# Patient Record
(demographics unavailable — no encounter records)

---

## 2024-11-14 NOTE — DISCUSSION/SUMMARY
[FreeTextEntry1] : ROYCE BRICE is a 52 year old F who presents today Nov 14, 2024 with the above history and the following active issues:  22 seconds of afib during endoscopy, heartburn, There are intermittent palpitations, dizziness, and lightheadedness. Reports edema after long car rides. Obtain exercise stress test to assess BP response to exercise, exercise induced arrhythmias, or evidence of ischemia. CT calcium score ordered for risk stratification. Obtain abdominal ultrasound to evaluate for aortic aneurysm or significant aortic atherosclerosis.  Obtain 7 day Zio monitor to determine presence of arrhythmias or significant pauses.  Re-consider EP eval at f/u OV. Declines today.  Brief new onset AHMET converting to sinus rhythm with rate control Lopressor. [id number] score 2, allergy to aspirin, currently not on anticoagulation. Hypotension, chlorthalidone discontinued Toprol reduced to 25 Mg daily.  - Hypertension at guideline goal on Toprol 25 Mg daily  - Family history of premature CAD father PCI stent age 60  Ongoing f/u with PCP.  F/U after testing to review results. Discussed red flag symptoms, which would warrant sooner or emergent medical evaluation. Any questions and concerns were addressed and resolved.  Sincerely, Alayna Ely [facility] Patient's history, testing, and plan was reviewed with supervising physician, Dr. Patrick Valentino

## 2024-11-14 NOTE — HISTORY OF PRESENT ILLNESS
[FreeTextEntry1] : ROYCE BRICE is a 52 year old female with a past medical history of Brief PAF May 2022, [id number] 2 not on AC, aspirin allergy., hypertension, hypothyroidism on Synthroid therapy, family history of premature CAD father PCI stent age 60.  Patient had palpitations admitted [facility] 5/9/2022 new onset AHMET converting to sinus rhythm with Lopressor rate control. Low risk [id number] score 2. Patient discharged home on Toprol 50 Mg daily, chlorthalidone 25 Mg. Patient had hypotension chlorthalidone discontinued and Toprol reduced to 25 Mg daily.  Last seen 5/2024. Here to review echo and carotid. See details below. In the interim, she had an endoscopy because she has been experiencing heart burn. Said she had 22 seconds of afib during it, but converted on her on. UT'ed with diaphragmatic hernia per patient report. Sx's of heartburn are improving on Pantoprazole, but still come with triggers. There are intermittent palpitations, dizziness, and lightheadedness. Sometimes noncompliant with thyroid med in past. She is now compliant. Reports edema after long car rides. Denies SOB, syncope, and claudication. Former smoker. Not on a formal exercise regimen.  Testing:  Echo 11/8/24: CONCLUSIONS: 1. Left ventricular cavity is normal in size. Left ventricular systolic function is normal with an ejection  fraction visually estimated at 60 to 65 %. 2. Normal left ventricular diastolic function. 3. Trace mitral regurgitation. 4. Trace tricuspid regurgitation. 5. No pericardial effusion seen. 6. Compared to the transthoracic echocardiogram performed on 5/27/2022, there have been no significant  interval changes.  Carotid u/s 11/2024: CONCLUSIONS: 1. Right: ICA no stenosis. Right Bulb: Mild smooth bordered plaque. 2. Left: ICA no stenosis. Left Bulb: Mild smooth bordered plaque. 3. Vertebral arteries: antegrade flow bilaterally. 4. Compared to the carotid report performed on 12/13/2019, there is no significant change  Labs 4/12/24: Chol 170, HDL 50, Trigs 190, LDL 92, Cr 0.78, Na 140, K 4.1, Ca 9, AST 19, ALT 31, WBC 9.8, Hgb 13.5, HCT 41.8, plt 361.  Zio 2023: SR average HR 70. Rare ectopy. V bige. V couplet. Sx's with SR/ST, v bigem, PACs, PVCs.  Stress echo 2020: Normal.